# Patient Record
Sex: FEMALE | Race: WHITE | NOT HISPANIC OR LATINO | Employment: OTHER | URBAN - METROPOLITAN AREA
[De-identification: names, ages, dates, MRNs, and addresses within clinical notes are randomized per-mention and may not be internally consistent; named-entity substitution may affect disease eponyms.]

---

## 2023-09-21 ENCOUNTER — HOSPITAL ENCOUNTER (OUTPATIENT)
Dept: DATA CONVERSION | Facility: HOSPITAL | Age: 68
Discharge: HOME | End: 2023-09-21
Payer: MEDICARE

## 2023-09-21 DIAGNOSIS — M25.511 PAIN IN RIGHT SHOULDER: ICD-10-CM

## 2024-01-26 DIAGNOSIS — H04.123 DRY EYES: ICD-10-CM

## 2024-01-26 RX ORDER — LEVOTHYROXINE SODIUM 75 UG/1
TABLET ORAL
COMMUNITY
Start: 2022-09-28

## 2024-01-26 RX ORDER — ROSUVASTATIN CALCIUM 5 MG/1
TABLET, COATED ORAL
COMMUNITY
Start: 2022-09-28

## 2024-01-26 RX ORDER — IBANDRONATE SODIUM 150 MG/1
TABLET, FILM COATED ORAL
COMMUNITY
Start: 2024-01-13

## 2024-01-26 RX ORDER — CYCLOSPORINE 0.5 MG/ML
1 EMULSION OPHTHALMIC EVERY 12 HOURS
Qty: 60 EACH | Refills: 3 | Status: SHIPPED | OUTPATIENT
Start: 2024-01-26 | End: 2024-01-29 | Stop reason: SDUPTHER

## 2024-01-26 RX ORDER — CYCLOSPORINE 0.5 MG/ML
1 EMULSION OPHTHALMIC EVERY 12 HOURS
COMMUNITY
End: 2024-01-26 | Stop reason: SDUPTHER

## 2024-01-29 ENCOUNTER — DOCUMENTATION (OUTPATIENT)
Dept: OPHTHALMOLOGY | Facility: CLINIC | Age: 69
End: 2024-01-29
Payer: MEDICARE

## 2024-01-29 DIAGNOSIS — H04.123 DRY EYES: ICD-10-CM

## 2024-01-29 RX ORDER — CYCLOSPORINE 0.5 MG/ML
1 EMULSION OPHTHALMIC EVERY 12 HOURS
Qty: 60 EACH | Refills: 3 | Status: SHIPPED | OUTPATIENT
Start: 2024-01-29

## 2024-01-29 RX ORDER — CYCLOSPORINE 0.5 MG/ML
1 EMULSION OPHTHALMIC 2 TIMES DAILY
Qty: 60 EACH | Refills: 11 | Status: SHIPPED | OUTPATIENT
Start: 2024-01-29 | End: 2025-01-28

## 2024-02-28 ENCOUNTER — OFFICE VISIT (OUTPATIENT)
Dept: OPHTHALMOLOGY | Facility: CLINIC | Age: 69
End: 2024-02-28
Payer: MEDICARE

## 2024-02-28 DIAGNOSIS — H04.123 BILATERAL DRY EYES: Primary | ICD-10-CM

## 2024-02-28 PROCEDURE — 92012 INTRM OPH EXAM EST PATIENT: CPT | Performed by: OPTOMETRIST

## 2024-02-28 PROCEDURE — 83861 MICROFLUID ANALY TEARS: CPT | Performed by: OPTOMETRIST

## 2024-02-28 PROCEDURE — 68761 CLOSE TEAR DUCT OPENING: CPT | Mod: RIGHT SIDE | Performed by: OPTOMETRIST

## 2024-02-28 ASSESSMENT — SLIT LAMP EXAM - LIDS
COMMENTS: GOOD POSITION
COMMENTS: GOOD POSITION

## 2024-02-28 ASSESSMENT — VISUAL ACUITY
CORRECTION_TYPE: GLASSES
METHOD: SNELLEN - LINEAR
OS_SC: 20/30
OS_SC+: -1
OD_SC: 20/160

## 2024-02-28 ASSESSMENT — ENCOUNTER SYMPTOMS
RESPIRATORY NEGATIVE: 0
MUSCULOSKELETAL NEGATIVE: 0
PSYCHIATRIC NEGATIVE: 0
EYES NEGATIVE: 0
ALLERGIC/IMMUNOLOGIC NEGATIVE: 0
CONSTITUTIONAL NEGATIVE: 0
HEMATOLOGIC/LYMPHATIC NEGATIVE: 0
CARDIOVASCULAR NEGATIVE: 0
NEUROLOGICAL NEGATIVE: 0
GASTROINTESTINAL NEGATIVE: 0
ENDOCRINE NEGATIVE: 0

## 2024-02-28 ASSESSMENT — SCHIRMERS TEST
OD_SCHIRMERS: 11
OS_SCHIRMERS: 9

## 2024-02-28 ASSESSMENT — CUP TO DISC RATIO
OD_RATIO: .4
OS_RATIO: .35

## 2024-02-28 ASSESSMENT — EXTERNAL EXAM - RIGHT EYE: OD_EXAM: NORMAL

## 2024-02-28 ASSESSMENT — EXTERNAL EXAM - LEFT EYE: OS_EXAM: NORMAL

## 2024-02-28 NOTE — PROGRESS NOTES
Testing to evaluate the presence of dry eye disease (DED) was performed today and provided the following results:  The ocular surface disease index questionnaire (OSDI) score was  (scale: 0-12 = normal, 13-22 = mild, 23-32 = moderate, >33 = severe): 86  TearLab, a test for tear film osmolarity revealed (normal <300 mOsm/L, mild 300-320, moderate 320-340, severe >340 mOsm/L, inter eye difference of >8 mOsm/L is considered abnormal as well)  OD:  315 mOsm/L  OS:  296 mOsm/L  Inflammadry, a test for the DED specific MMP-9 inhibitor:  OD:  unable  OS:  unable   Schirmer's test with anesthetic, testing basal tear production (0-5 = severe, 6-10 = moderate, 11-15 = mild):  OD:  11 mm between 1-6 minutes  OS:  9 mm between 1-6 minutes  Tear break up time (TBUT), a measure of tear stability (0-5 = severe, 6-10 = moderate, 11-15 = mild)  OD:  2 seconds  OS:  2 seconds  Corneal punctate epithelial erosions (PEE) staining with sodium fluorescein score (5 zones, each PEE level 0-3, maximum score = 15)  OD: NIH PEE score:  +1I Central PEE absent  OS: NIH PEE score:  +1I Central PEE absent     Cyclosporine bid OU, Refresh optive tid OU. Stopped doing Wcs, wears eye makeup and would make eyes itchy. Discontinued CL use, OS was alright with I&R, but OD was a constant struggle, always getting a bubble, etc. Tried for a few months but felt there was no overall benefit with use. 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 Feels her current routine does help, but persistent glare.     Inserted punctal plugs [place] 0.5 RLL LLL. Lot [id number] went in snug both sides, probably no room for 0.6  Continue restasis and iVizia coupon. Resume WC 5 minute at least every day.     Rtc 6 months for full DFE and all dry eye tests and manifest refraction (MR).   
No

## 2024-04-04 ENCOUNTER — LAB (OUTPATIENT)
Dept: LAB | Facility: LAB | Age: 69
End: 2024-04-04
Payer: MEDICARE

## 2024-04-04 DIAGNOSIS — E03.9 HYPOTHYROIDISM, UNSPECIFIED: ICD-10-CM

## 2024-04-04 DIAGNOSIS — R53.83 OTHER FATIGUE: Primary | ICD-10-CM

## 2024-04-04 LAB
25(OH)D3 SERPL-MCNC: 64 NG/ML (ref 31–100)
ALBUMIN SERPL-MCNC: 4.7 G/DL (ref 3.5–5)
ALP BLD-CCNC: 67 U/L (ref 35–125)
ALT SERPL-CCNC: 26 U/L (ref 5–40)
ANION GAP SERPL CALC-SCNC: 14 MMOL/L
AST SERPL-CCNC: 23 U/L (ref 5–40)
BASOPHILS # BLD AUTO: 0.04 X10*3/UL (ref 0–0.1)
BASOPHILS NFR BLD AUTO: 0.8 %
BILIRUB SERPL-MCNC: 0.4 MG/DL (ref 0.1–1.2)
BUN SERPL-MCNC: 9 MG/DL (ref 8–25)
CALCIUM SERPL-MCNC: 9.9 MG/DL (ref 8.5–10.4)
CHLORIDE SERPL-SCNC: 103 MMOL/L (ref 97–107)
CO2 SERPL-SCNC: 25 MMOL/L (ref 24–31)
CREAT SERPL-MCNC: 0.7 MG/DL (ref 0.4–1.6)
EGFRCR SERPLBLD CKD-EPI 2021: >90 ML/MIN/1.73M*2
EOSINOPHIL # BLD AUTO: 0.07 X10*3/UL (ref 0–0.7)
EOSINOPHIL NFR BLD AUTO: 1.4 %
ERYTHROCYTE [DISTWIDTH] IN BLOOD BY AUTOMATED COUNT: 11.9 % (ref 11.5–14.5)
GLUCOSE SERPL-MCNC: 104 MG/DL (ref 65–99)
HCT VFR BLD AUTO: 46.2 % (ref 36–46)
HGB BLD-MCNC: 15.1 G/DL (ref 12–16)
IMM GRANULOCYTES # BLD AUTO: 0.02 X10*3/UL (ref 0–0.7)
IMM GRANULOCYTES NFR BLD AUTO: 0.4 % (ref 0–0.9)
LYMPHOCYTES # BLD AUTO: 1.24 X10*3/UL (ref 1.2–4.8)
LYMPHOCYTES NFR BLD AUTO: 24.2 %
MAGNESIUM SERPL-MCNC: 2.2 MG/DL (ref 1.6–3.1)
MCH RBC QN AUTO: 30.6 PG (ref 26–34)
MCHC RBC AUTO-ENTMCNC: 32.7 G/DL (ref 32–36)
MCV RBC AUTO: 94 FL (ref 80–100)
MONOCYTES # BLD AUTO: 0.31 X10*3/UL (ref 0.1–1)
MONOCYTES NFR BLD AUTO: 6.1 %
NEUTROPHILS # BLD AUTO: 3.44 X10*3/UL (ref 1.2–7.7)
NEUTROPHILS NFR BLD AUTO: 67.1 %
NRBC BLD-RTO: 0 /100 WBCS (ref 0–0)
PLATELET # BLD AUTO: 283 X10*3/UL (ref 150–450)
POTASSIUM SERPL-SCNC: 4.4 MMOL/L (ref 3.4–5.1)
PROT SERPL-MCNC: 7.2 G/DL (ref 5.9–7.9)
RBC # BLD AUTO: 4.94 X10*6/UL (ref 4–5.2)
SODIUM SERPL-SCNC: 142 MMOL/L (ref 133–145)
TSH SERPL DL<=0.05 MIU/L-ACNC: 2.49 MIU/L (ref 0.27–4.2)
VIT B12 SERPL-MCNC: 686 PG/ML (ref 211–946)
WBC # BLD AUTO: 5.1 X10*3/UL (ref 4.4–11.3)

## 2024-04-04 PROCEDURE — 80053 COMPREHEN METABOLIC PANEL: CPT

## 2024-04-04 PROCEDURE — 85025 COMPLETE CBC W/AUTO DIFF WBC: CPT

## 2024-04-04 PROCEDURE — 84443 ASSAY THYROID STIM HORMONE: CPT

## 2024-04-04 PROCEDURE — 83735 ASSAY OF MAGNESIUM: CPT

## 2024-04-04 PROCEDURE — 82306 VITAMIN D 25 HYDROXY: CPT

## 2024-04-04 PROCEDURE — 82607 VITAMIN B-12: CPT

## 2024-04-04 PROCEDURE — 36415 COLL VENOUS BLD VENIPUNCTURE: CPT

## 2024-08-28 ENCOUNTER — APPOINTMENT (OUTPATIENT)
Dept: OPHTHALMOLOGY | Facility: CLINIC | Age: 69
End: 2024-08-28
Payer: MEDICARE

## 2024-08-28 DIAGNOSIS — H04.123 BILATERAL DRY EYES: ICD-10-CM

## 2024-08-28 DIAGNOSIS — H52.203 ASTIGMATISM OF BOTH EYES WITH PRESBYOPIA: Primary | ICD-10-CM

## 2024-08-28 DIAGNOSIS — H52.4 ASTIGMATISM OF BOTH EYES WITH PRESBYOPIA: Primary | ICD-10-CM

## 2024-08-28 PROCEDURE — 92014 COMPRE OPH EXAM EST PT 1/>: CPT | Performed by: OPTOMETRIST

## 2024-08-28 PROCEDURE — 92015 DETERMINE REFRACTIVE STATE: CPT | Performed by: OPTOMETRIST

## 2024-08-28 PROCEDURE — 83516 IMMUNOASSAY NONANTIBODY: CPT | Performed by: OPTOMETRIST

## 2024-08-28 PROCEDURE — 68761 CLOSE TEAR DUCT OPENING: CPT | Mod: RIGHT SIDE | Performed by: OPTOMETRIST

## 2024-08-28 PROCEDURE — 83861 MICROFLUID ANALY TEARS: CPT | Performed by: OPTOMETRIST

## 2024-08-28 ASSESSMENT — CONF VISUAL FIELD
OD_SUPERIOR_TEMPORAL_RESTRICTION: 0
METHOD: COUNTING FINGERS
OD_INFERIOR_NASAL_RESTRICTION: 0
OS_INFERIOR_NASAL_RESTRICTION: 0
OS_NORMAL: 1
OS_SUPERIOR_NASAL_RESTRICTION: 0
OD_NORMAL: 1
OS_SUPERIOR_TEMPORAL_RESTRICTION: 0
OD_INFERIOR_TEMPORAL_RESTRICTION: 0
OD_SUPERIOR_NASAL_RESTRICTION: 0
OS_INFERIOR_TEMPORAL_RESTRICTION: 0

## 2024-08-28 ASSESSMENT — REFRACTION_MANIFEST
OS_CYLINDER: -1.25
OS_AXIS: 020
OD_SPHERE: -2.25
OS_ADD: +2.50
OD_ADD: +2.50
OD_CYLINDER: -2.50
OD_AXIS: 015
OS_SPHERE: -0.50

## 2024-08-28 ASSESSMENT — ENCOUNTER SYMPTOMS
PSYCHIATRIC NEGATIVE: 0
MUSCULOSKELETAL NEGATIVE: 0
NEUROLOGICAL NEGATIVE: 0
RESPIRATORY NEGATIVE: 0
EYES NEGATIVE: 0
CARDIOVASCULAR NEGATIVE: 0
CONSTITUTIONAL NEGATIVE: 0
ALLERGIC/IMMUNOLOGIC NEGATIVE: 0
GASTROINTESTINAL NEGATIVE: 0
ENDOCRINE NEGATIVE: 0
HEMATOLOGIC/LYMPHATIC NEGATIVE: 0

## 2024-08-28 ASSESSMENT — REFRACTION_WEARINGRX
OD_AXIS: 012
OD_ADD: +2.50
OS_CYLINDER: -2.00
OS_SPHERE: -0.50
OD_CYLINDER: -1.75
OS_ADD: +2.50
OD_SPHERE: -2.00
OS_AXIS: 025

## 2024-08-28 ASSESSMENT — VISUAL ACUITY
CORRECTION_TYPE: GLASSES
OS_CC: 20/25
METHOD: SNELLEN - LINEAR
OS_CC+: -2
OD_CC: 20/200

## 2024-08-28 ASSESSMENT — CUP TO DISC RATIO
OD_RATIO: .4
OS_RATIO: .35

## 2024-08-28 ASSESSMENT — TONOMETRY
IOP_METHOD: GOLDMANN APPLANATION
OD_IOP_MMHG: 13
OS_IOP_MMHG: 14

## 2024-08-28 ASSESSMENT — SLIT LAMP EXAM - LIDS
COMMENTS: GOOD POSITION
COMMENTS: GOOD POSITION

## 2024-08-28 ASSESSMENT — EXTERNAL EXAM - RIGHT EYE: OD_EXAM: NORMAL

## 2024-08-28 ASSESSMENT — EXTERNAL EXAM - LEFT EYE: OS_EXAM: NORMAL

## 2024-08-28 ASSESSMENT — SCHIRMERS TEST
OS_SCHIRMERS: 18
OD_SCHIRMERS: 20

## 2024-08-28 NOTE — PROGRESS NOTES
Testing to evaluate the presence of dry eye disease (DED) was performed today and provided the following results:  The ocular surface disease index questionnaire (OSDI) score was  (scale: 0-12 = normal, 13-22 = mild, 23-32 = moderate, >33 = severe): 69 was 86  TearLab, a test for tear film osmolarity revealed (normal <300 mOsm/L, mild 300-320, moderate 320-340, severe >340 mOsm/L, inter eye difference of >8 mOsm/L is considered abnormal as well)  OD:  341 was 315 mOsm/L  OS:  299 was 296 mOsm/L  Inflammadry, a test for the DED specific MMP-9 inhibitor:  OD:  negative was unable  OS:  negative was unable   Schirmer's test with anesthetic, testing basal tear production (0-5 = severe, 6-10 = moderate, 11-15 = mild):  OD:  20 was 11 mm between 1-6 minutes  OS:  18 was 9 mm between 1-6 minutes  Tear break up time (TBUT), a measure of tear stability (0-5 = severe, 6-10 = moderate, 11-15 = mild)  OD:  2 seconds  OS:  2 seconds  Corneal punctate epithelial erosions (PEE) staining with sodium fluorescein score (5 zones, each PEE level 0-3, maximum score = 15)  OD: NIH PEE score:  +1I Central PEE absent  OS: NIH PEE score:  +1I Central PEE absent     Blepharitis: Meibomain gland disease (Effron scale 0-4, 0:no abnormality, 4: thick creamy yellow expression at all gland orifices, expression continuous, conjunctival redness). Collarettes (0-4, 0: 0-2 lashes with collarettes, 1: 3-10, 2: >10 but <1/3rd of lashes, 3: >1/3rd to <2/3rd of lashes, 4: >2/3rd of lashes.  Right eye (OD): stage 1 collarettes: stage 0  Left eye (OS): stage 1 collarettes: stage 0     NO collarettes however patient does have acne rosacea mostly on the nose. May decide to treat with XDEMVY in the future, due to absence of collarettes will hold off for now.      Cyclosporine bid OU, Refresh optive tid OU. Stopped doing Wcs, wears eye makeup and would make eyes itchy. Discontinued CL use, OS was alright with I&R, but OD was a constant struggle, always getting  a bubble, etc. Tried for a few months but felt there was no overall benefit with use. 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 Feels her current routine does help, but persistent glare.     Inserted punctal plugs [place] 0.5 RLL LLL. Lot [id number] went in snug both sides, probably no room for 0.6. Lost RLL and inserted 0.5 mm lot [id number] and went inwasy and there is room now for 0.6 mm.    Regimen: Restasis, PFATs Refresh Optive, WC 5 minute at least every day.    Hx of amblyopia 20/200 OD and  20/25 OS and has been stable. A spectacle prescription was dispensed to be used as needed. Trivex or polycarbonate.     PCIOL in place and clear.    PVD OU.     Rtc 6 months for [facility] eyelid check and all dry eye tests and manifest refraction (MR).

## 2024-09-11 ENCOUNTER — HOSPITAL ENCOUNTER (OUTPATIENT)
Dept: RADIOLOGY | Facility: HOSPITAL | Age: 69
Discharge: HOME | End: 2024-09-11
Payer: MEDICARE

## 2024-09-11 VITALS — BODY MASS INDEX: 23.79 KG/M2 | WEIGHT: 126 LBS | HEIGHT: 61 IN

## 2024-09-11 DIAGNOSIS — Z12.31 SCREENING MAMMOGRAM FOR BREAST CANCER: ICD-10-CM

## 2024-09-11 PROCEDURE — 77067 SCR MAMMO BI INCL CAD: CPT | Performed by: RADIOLOGY

## 2024-09-11 PROCEDURE — 77067 SCR MAMMO BI INCL CAD: CPT

## 2024-09-11 PROCEDURE — 77063 BREAST TOMOSYNTHESIS BI: CPT | Performed by: RADIOLOGY

## 2024-09-28 ENCOUNTER — LAB (OUTPATIENT)
Dept: LAB | Facility: LAB | Age: 69
End: 2024-09-28
Payer: MEDICARE

## 2024-09-28 ENCOUNTER — APPOINTMENT (OUTPATIENT)
Dept: LAB | Facility: LAB | Age: 69
End: 2024-09-28
Payer: MEDICARE

## 2024-09-28 DIAGNOSIS — Z79.83 LONG TERM (CURRENT) USE OF BISPHOSPHONATES: ICD-10-CM

## 2024-09-28 DIAGNOSIS — M81.0 AGE-RELATED OSTEOPOROSIS WITHOUT CURRENT PATHOLOGICAL FRACTURE: Primary | ICD-10-CM

## 2024-09-28 DIAGNOSIS — E03.9 HYPOTHYROIDISM, UNSPECIFIED: ICD-10-CM

## 2024-09-28 DIAGNOSIS — Z00.00 ENCOUNTER FOR GENERAL ADULT MEDICAL EXAMINATION WITHOUT ABNORMAL FINDINGS: Primary | ICD-10-CM

## 2024-09-28 DIAGNOSIS — E78.5 HYPERLIPIDEMIA, UNSPECIFIED: ICD-10-CM

## 2024-09-28 DIAGNOSIS — M81.0 AGE-RELATED OSTEOPOROSIS WITHOUT CURRENT PATHOLOGICAL FRACTURE: ICD-10-CM

## 2024-09-28 DIAGNOSIS — R73.02 IMPAIRED GLUCOSE TOLERANCE (ORAL): ICD-10-CM

## 2024-09-28 LAB
ALBUMIN SERPL BCP-MCNC: 4.9 G/DL (ref 3.4–5)
ALP SERPL-CCNC: 68 U/L (ref 33–136)
ALT SERPL W P-5'-P-CCNC: 29 U/L (ref 7–45)
ANION GAP SERPL CALCULATED.3IONS-SCNC: 12 MMOL/L (ref 10–20)
AST SERPL W P-5'-P-CCNC: 29 U/L (ref 9–39)
BASOPHILS # BLD AUTO: 0.05 X10*3/UL (ref 0–0.1)
BASOPHILS NFR BLD AUTO: 1.2 %
BILIRUB SERPL-MCNC: 0.7 MG/DL (ref 0–1.2)
BUN SERPL-MCNC: 7 MG/DL (ref 6–23)
CALCIUM SERPL-MCNC: 9.9 MG/DL (ref 8.6–10.3)
CHLORIDE SERPL-SCNC: 104 MMOL/L (ref 98–107)
CHOLEST SERPL-MCNC: 270 MG/DL (ref 0–199)
CHOLEST/HDLC SERPL: 3.4 {RATIO}
CO2 SERPL-SCNC: 28 MMOL/L (ref 21–32)
CREAT SERPL-MCNC: 0.63 MG/DL (ref 0.5–1.05)
EGFRCR SERPLBLD CKD-EPI 2021: >90 ML/MIN/1.73M*2
EOSINOPHIL # BLD AUTO: 0.11 X10*3/UL (ref 0–0.7)
EOSINOPHIL NFR BLD AUTO: 2.6 %
ERYTHROCYTE [DISTWIDTH] IN BLOOD BY AUTOMATED COUNT: 12.3 % (ref 11.5–14.5)
EST. AVERAGE GLUCOSE BLD GHB EST-MCNC: 111 MG/DL
GLUCOSE SERPL-MCNC: 98 MG/DL (ref 74–99)
HBA1C MFR BLD: 5.5 %
HCT VFR BLD AUTO: 48.2 % (ref 36–46)
HDLC SERPL-MCNC: 79.9 MG/DL
HGB BLD-MCNC: 16 G/DL (ref 12–16)
IMM GRANULOCYTES # BLD AUTO: 0.01 X10*3/UL (ref 0–0.7)
IMM GRANULOCYTES NFR BLD AUTO: 0.2 % (ref 0–0.9)
LDLC SERPL CALC-MCNC: 158 MG/DL
LYMPHOCYTES # BLD AUTO: 1.02 X10*3/UL (ref 1.2–4.8)
LYMPHOCYTES NFR BLD AUTO: 23.9 %
MCH RBC QN AUTO: 31.4 PG (ref 26–34)
MCHC RBC AUTO-ENTMCNC: 33.2 G/DL (ref 32–36)
MCV RBC AUTO: 95 FL (ref 80–100)
MONOCYTES # BLD AUTO: 0.33 X10*3/UL (ref 0.1–1)
MONOCYTES NFR BLD AUTO: 7.7 %
NEUTROPHILS # BLD AUTO: 2.74 X10*3/UL (ref 1.2–7.7)
NEUTROPHILS NFR BLD AUTO: 64.4 %
NON HDL CHOLESTEROL: 190 MG/DL (ref 0–149)
NRBC BLD-RTO: 0 /100 WBCS (ref 0–0)
PLATELET # BLD AUTO: 241 X10*3/UL (ref 150–450)
POTASSIUM SERPL-SCNC: 4.4 MMOL/L (ref 3.5–5.3)
PROT SERPL-MCNC: 7.6 G/DL (ref 6.4–8.2)
RBC # BLD AUTO: 5.1 X10*6/UL (ref 4–5.2)
SODIUM SERPL-SCNC: 140 MMOL/L (ref 136–145)
TRIGL SERPL-MCNC: 161 MG/DL (ref 0–149)
TSH SERPL-ACNC: 3.33 MIU/L (ref 0.44–3.98)
VLDL: 32 MG/DL (ref 0–40)
WBC # BLD AUTO: 4.3 X10*3/UL (ref 4.4–11.3)

## 2024-09-28 PROCEDURE — 83036 HEMOGLOBIN GLYCOSYLATED A1C: CPT

## 2024-09-28 PROCEDURE — 36415 COLL VENOUS BLD VENIPUNCTURE: CPT

## 2024-09-28 PROCEDURE — 82523 COLLAGEN CROSSLINKS: CPT

## 2024-09-28 PROCEDURE — 84443 ASSAY THYROID STIM HORMONE: CPT

## 2024-09-28 PROCEDURE — 80053 COMPREHEN METABOLIC PANEL: CPT

## 2024-09-28 PROCEDURE — 85025 COMPLETE CBC W/AUTO DIFF WBC: CPT

## 2024-09-28 PROCEDURE — 80061 LIPID PANEL: CPT

## 2024-10-01 LAB — COLLAGEN CTX SERPL-MCNC: 271 PG/ML

## 2024-12-16 DIAGNOSIS — H04.123 DRY EYES: ICD-10-CM

## 2024-12-16 RX ORDER — CYCLOSPORINE 0.5 MG/ML
1 EMULSION OPHTHALMIC 2 TIMES DAILY
Qty: 180 EACH | Refills: 3 | Status: SHIPPED | OUTPATIENT
Start: 2024-12-16 | End: 2025-12-16

## 2025-02-12 ENCOUNTER — APPOINTMENT (OUTPATIENT)
Dept: OPHTHALMOLOGY | Facility: CLINIC | Age: 70
End: 2025-02-12
Payer: MEDICARE

## 2025-02-12 DIAGNOSIS — H04.123 BILATERAL DRY EYES: Primary | ICD-10-CM

## 2025-02-12 DIAGNOSIS — H10.829 ROSACEA BLEPHAROCONJUNCTIVITIS: ICD-10-CM

## 2025-02-12 PROCEDURE — 92012 INTRM OPH EXAM EST PATIENT: CPT | Performed by: OPTOMETRIST

## 2025-02-12 PROCEDURE — 68761 CLOSE TEAR DUCT OPENING: CPT | Mod: RIGHT SIDE | Performed by: OPTOMETRIST

## 2025-02-12 ASSESSMENT — REFRACTION_MANIFEST
OS_SPHERE: -0.75
OD_AXIS: 015
OS_ADD: +2.50
OD_CYLINDER: -2.50
OS_CYLINDER: -1.50
OD_SPHERE: -2.25
OS_AXIS: 020
OD_ADD: +2.50

## 2025-02-12 ASSESSMENT — REFRACTION_WEARINGRX
SPECS_TYPE: TRIVEX OR POLYCARBONATE
OD_AXIS: 015
OD_CYLINDER: -2.50
OD_ADD: +2.50
OS_CYLINDER: -1.25
OS_SPHERE: -0.50
OS_AXIS: 020
OD_SPHERE: -2.25
OS_ADD: +2.50

## 2025-02-12 ASSESSMENT — ENCOUNTER SYMPTOMS
HEMATOLOGIC/LYMPHATIC NEGATIVE: 0
MUSCULOSKELETAL NEGATIVE: 0
PSYCHIATRIC NEGATIVE: 0
CONSTITUTIONAL NEGATIVE: 0
EYES NEGATIVE: 1
RESPIRATORY NEGATIVE: 0
ENDOCRINE NEGATIVE: 0
NEUROLOGICAL NEGATIVE: 0
GASTROINTESTINAL NEGATIVE: 0
CARDIOVASCULAR NEGATIVE: 0
ALLERGIC/IMMUNOLOGIC NEGATIVE: 0

## 2025-02-12 ASSESSMENT — TONOMETRY
OD_IOP_MMHG: 14
OS_IOP_MMHG: 14
IOP_METHOD: GOLDMANN APPLANATION

## 2025-02-12 ASSESSMENT — VISUAL ACUITY
METHOD: SNELLEN - LINEAR
CORRECTION_TYPE: GLASSES
OS_CC: 20/25
OD_CC: 20/200

## 2025-02-12 NOTE — PROGRESS NOTES
Testing to evaluate the presence of dry eye disease (DED) was performed today and provided the following results:  The ocular surface disease index questionnaire (OSDI) score was  (scale: 0-12 = normal, 13-22 = mild, 23-32 = moderate, >33 = severe): 69 was 86  TearLab, a test for tear film osmolarity revealed (normal <300 mOsm/L, mild 300-320, moderate 320-340, severe >340 mOsm/L, inter eye difference of >8 mOsm/L is considered abnormal as well)  OD:  341 was 315 mOsm/L  OS:  299 was 296 mOsm/L  Inflammadry, a test for the DED specific MMP-9 inhibitor:  OD:  negative was unable  OS:  negative was unable   Schirmer's test with anesthetic, testing basal tear production (0-5 = severe, 6-10 = moderate, 11-15 = mild):  OD:  20 was 11 mm between 1-6 minutes  OS:  18 was 9 mm between 1-6 minutes  Tear break up time (TBUT), a measure of tear stability (0-5 = severe, 6-10 = moderate, 11-15 = mild)  OD:  2 seconds  OS:  2 seconds  Corneal punctate epithelial erosions (PEE) staining with sodium fluorescein score (5 zones, each PEE level 0-3, maximum score = 15)  OD: NIH PEE score:  +1I Central PEE absent  OS: NIH PEE score:  +1I Central PEE absent     Blepharitis: Meibomain gland disease (Effron scale 0-4, 0:no abnormality, 4: thick creamy yellow expression at all gland orifices, expression continuous, conjunctival redness). Collarettes (0-4, 0: 0-2 lashes with collarettes, 1: 3-10, 2: >10 but <1/3rd of lashes, 3: >1/3rd to <2/3rd of lashes, 4: >2/3rd of lashes.  Right eye (OD): stage 1 collarettes: stage 0  Left eye (OS): stage 1 collarettes: stage 0     NO collarettes however patient does have acne rosacea mostly on the nose. May decide to treat with XDEMVY in the future, due to absence of collarettes will hold off for now.      Cyclosporine bid OU, Refresh optive tid OU. Stopped doing Wcs, wears eye makeup and would make eyes itchy. Discontinued CL use, OS was alright with I&R, but OD was a constant struggle, always getting  a bubble, etc. Tried for a few months but felt there was no overall benefit with use. 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 Feels her current routine does help, but persistent glare.     Inserted punctal plugs [place] 0.5 RLL LLL. Lot [id number] went in snug both sides, probably no room for 0.6. Lost RLL and inserted 0.5 mm lot [id number] and went inwasy and there is room now for 0.6 mm. Lost RLL and inserted new 0.6 eagle plug lot [id number] went in snug, may even have room for 0.7 mm if needed.     Regimen: Restasis, PFATs Refresh Optive, WC 5 minute at least every day.    Hx of amblyopia 20/200 OD and  20/25 OS and has been stable. A spectacle prescription was dispensed to be used as needed. Trivex or polycarbonate.     PCIOL in place and clear.    PVD OU.     Returned for flaking of the eyelids. This has improved after discontinuation of Fred Cold Cream to take off make-up. Has also stopped using Metrogel cream (was a gel before) on nose for rosacea. Using for rosacea. Will see PCP for rosacea management and general care in 1 month and patient will have dermatological care for rosacea reviewed.   Rtc 63months for [facility] eyelid and plug check and all dry eye tests and manifest refraction (MR).

## 2025-03-04 ENCOUNTER — APPOINTMENT (OUTPATIENT)
Dept: OPHTHALMOLOGY | Facility: CLINIC | Age: 70
End: 2025-03-04
Payer: MEDICARE

## 2025-07-29 ENCOUNTER — APPOINTMENT (OUTPATIENT)
Dept: OPHTHALMOLOGY | Facility: CLINIC | Age: 70
End: 2025-07-29
Payer: MEDICARE

## 2025-07-29 DIAGNOSIS — H53.001 AMBLYOPIA, RIGHT EYE: ICD-10-CM

## 2025-07-29 DIAGNOSIS — H52.203 MYOPIA OF BOTH EYES WITH ASTIGMATISM AND PRESBYOPIA: Primary | ICD-10-CM

## 2025-07-29 DIAGNOSIS — H10.829 ROSACEA BLEPHAROCONJUNCTIVITIS: ICD-10-CM

## 2025-07-29 DIAGNOSIS — H52.13 MYOPIA OF BOTH EYES WITH ASTIGMATISM AND PRESBYOPIA: Primary | ICD-10-CM

## 2025-07-29 DIAGNOSIS — H52.4 MYOPIA OF BOTH EYES WITH ASTIGMATISM AND PRESBYOPIA: Primary | ICD-10-CM

## 2025-07-29 DIAGNOSIS — H04.123 BILATERAL DRY EYES: ICD-10-CM

## 2025-07-29 PROCEDURE — 99213 OFFICE O/P EST LOW 20 MIN: CPT | Performed by: OPTOMETRIST

## 2025-07-29 PROCEDURE — 68761 CLOSE TEAR DUCT OPENING: CPT | Mod: RIGHT SIDE | Performed by: OPTOMETRIST

## 2025-07-29 PROCEDURE — 83861 MICROFLUID ANALY TEARS: CPT | Performed by: OPTOMETRIST

## 2025-07-29 PROCEDURE — 92015 DETERMINE REFRACTIVE STATE: CPT | Performed by: OPTOMETRIST

## 2025-07-29 ASSESSMENT — TONOMETRY
OD_IOP_MMHG: 11
OS_IOP_MMHG: 10
IOP_METHOD: TONOPEN

## 2025-07-29 ASSESSMENT — VISUAL ACUITY
OD_PH_CC+: +2
OS_CC+: +2
CORRECTION_TYPE: GLASSES
OD_PH_CC: 20/200
OS_CC: 20/30
METHOD: SNELLEN - LINEAR
OD_CC: 20/200
OD_CC+: +1

## 2025-07-29 ASSESSMENT — SCHIRMERS TEST
OD_SCHIRMERS: 10
OS_SCHIRMERS: 6

## 2025-07-29 ASSESSMENT — REFRACTION_WEARINGRX
OS_SPHERE: -0.50
OS_CYLINDER: -1.25
OD_AXIS: 015
OS_ADD: +2.50
SPECS_TYPE: PROG
OD_ADD: +2.50
OD_CYLINDER: -2.50
OS_AXIS: 020
OD_SPHERE: -2.25

## 2025-07-29 ASSESSMENT — SLIT LAMP EXAM - LIDS
COMMENTS: GOOD POSITION
COMMENTS: GOOD POSITION

## 2025-07-29 ASSESSMENT — REFRACTION_MANIFEST
OD_AXIS: 015
OS_AXIS: 020
OD_CYLINDER: -2.50
OS_ADD: +2.50
OS_CYLINDER: -2.00
OS_SPHERE: -1.00
OD_SPHERE: -2.25
OD_ADD: +2.50

## 2025-07-29 ASSESSMENT — EXTERNAL EXAM - LEFT EYE: OS_EXAM: NORMAL

## 2025-07-29 ASSESSMENT — EXTERNAL EXAM - RIGHT EYE: OD_EXAM: NORMAL

## 2025-07-29 ASSESSMENT — CUP TO DISC RATIO
OS_RATIO: .35
OD_RATIO: .4

## 2025-07-29 NOTE — PROGRESS NOTES
Testing to evaluate the presence of dry eye disease (DED) was performed today and provided the following results:  The ocular surface disease index questionnaire (OSDI) score was  (scale: 0-12 = normal, 13-22 = mild, 23-32 = moderate, >33 = severe): 48 was 69 was 86  TearLab, a test for tear film osmolarity revealed (normal <300 mOsm/L, mild 300-320, moderate 320-340, severe >340 mOsm/L, inter eye difference of >8 mOsm/L is considered abnormal as well)  OD:  321 was 341 was 315 mOsm/L  OS:  322 was 299 was 296 mOsm/L  Inflammadry, a test for the DED specific MMP-9 inhibitor:  OD:  negative was unable  OS:  negative was unable   Schirmer's test with anesthetic, testing basal tear production (0-5 = severe, 6-10 = moderate, 11-15 = mild):  OD:  10 was 20 was 11 mm between 1-6 minutes  OS:  6 was 18 was 9 mm between 1-6 minutes  Tear break up time (TBUT), a measure of tear stability (0-5 = severe, 6-10 = moderate, 11-15 = mild)  OD:  2 seconds  OS:  2 seconds  Corneal punctate epithelial erosions (PEE) staining with sodium fluorescein score (5 zones, each PEE level 0-3, maximum score = 15)  OD: NIH PEE score:  +1I Central PEE absent  OS: NIH PEE score:  +1I Central PEE absent     Blepharitis: Meibomain gland disease (Effron scale 0-4, 0:no abnormality, 4: thick creamy yellow expression at all gland orifices, expression continuous, conjunctival redness). Collarettes (0-4, 0: 0-2 lashes with collarettes, 1: 3-10, 2: >10 but <1/3rd of lashes, 3: >1/3rd to <2/3rd of lashes, 4: >2/3rd of lashes.  Right eye (OD): stage 1 collarettes: stage 0  Left eye (OS): stage 1 collarettes: stage 0     NO collarettes however patient does have acne rosacea mostly on the nose. May decide to treat with XDEMVY in the future, due to absence of collarettes will hold off for now.      Hx of CL use but Discontinued CL use, OS was alright with I&R, but OD was a constant struggle, always getting a bubble, etc. Tried for a few months but felt there  was no overall benefit with use. 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     Inserted punctal plugs [place] 0.5 RLL LLL. Lot [id number] went in snug both sides, probably no room for 0.6. Lost RLL and inserted 0.5 mm lot [id number] and went inwasy and there is room now for 0.6 mm. Lost RLL and inserted new 0.6 eagle plug lot [id number] went in snug, may even have room for 0.7 mm if needed. Lost RLL plug and inserted 0.7 mm Eagle plug lot [id number]    Regimen: Restasis, PFATs Refresh Optive, WC 5 minute at least every day.    Hx of amblyopia 20/200 OD and  20/25 OS and has been stable. A spectacle prescription was dispensed to be used as needed. Trivex or polycarbonate.     PCIOL in place and clear.    PVD OU.     Returned for flaking of the eyelids. This has improved after discontinuation of Fred Cold Cream to take off make-up. Has also stopped using Metrogel cream (was a gel before) on nose for rosacea.     Did not see PCP for rosacea management yet for dermatological care for rosacea reviewed.     Current regimen: cyclosporine BID OU and Refresh AFTs BID/TID OU.  Has started using Neutrogena face wipes and they don't seem to be causing the same issues the [place] cold cream was    A spectacle prescription was dispensed to be used as needed.  Trivex/polycarb    Continue regimen. Inserted new plug RLL.     Rtc 6 months for [facility] eyelid and plug check and all dry eye tests

## 2026-02-03 ENCOUNTER — APPOINTMENT (OUTPATIENT)
Dept: OPHTHALMOLOGY | Facility: CLINIC | Age: 71
End: 2026-02-03
Payer: MEDICARE